# Patient Record
Sex: FEMALE | Race: WHITE | NOT HISPANIC OR LATINO | URBAN - METROPOLITAN AREA
[De-identification: names, ages, dates, MRNs, and addresses within clinical notes are randomized per-mention and may not be internally consistent; named-entity substitution may affect disease eponyms.]

---

## 2018-04-10 ENCOUNTER — TELEPHONE (OUTPATIENT)
Dept: ORTHOPEDICS | Facility: CLINIC | Age: 73
End: 2018-04-10

## 2018-04-10 NOTE — TELEPHONE ENCOUNTER
[facility] Call Center    Phone Message    May a detailed message be left on voicemail: yes    Reason for Call: Other: pt is wanting to know if you received her medical records? She wants to know when she will be able to come to see Dr Watkins . The medical records should have been come from [facility]. Pt saw Dr Valerio Alfredo.    Action Taken: Message routed to:  [facility] ([facility]): Orthopedics     4/13/18  Left message for patient to let her know we have not received her records.  Left fax #  [phone].  Asked her to call back if she has additional questions.

## 2018-04-13 ENCOUNTER — TELEPHONE (OUTPATIENT)
Dept: ORTHOPEDICS | Facility: CLINIC | Age: 73
End: 2018-04-13

## 2018-04-13 NOTE — TELEPHONE ENCOUNTER
I left a VM with Chary to inform her that unfortunately we have not received any records from [facility] regarding a possible referral to see Dr. Roque Watkins. I asked that she contact Dr. Alfredo's Office to obtain her Records and provided the Clinic Fax # ([phone]) so that we can evaluate why she is being seen and where to properly schedule her.

## 2018-05-04 ENCOUNTER — MEDICAL CORRESPONDENCE (OUTPATIENT)
Dept: HEALTH INFORMATION MANAGEMENT | Facility: CLINIC | Age: 73
End: 2018-05-04

## 2018-05-17 ENCOUNTER — TELEPHONE (OUTPATIENT)
Dept: ORTHOPEDICS | Facility: CLINIC | Age: 73
End: 2018-05-17

## 2018-05-17 NOTE — TELEPHONE ENCOUNTER
RN called and spoke with Chary.  We have received records from her([facility]) and a disc from [facility], right knee done on 09-07-17.  She was informed that she needs to have her Orthopedic Surgeon send a referral to Dr. Watkins.  She will do this. We also note that the manipulation that she had done was not included in the paperwork.  She has no other questions or concerns.

## 2018-06-01 NOTE — TELEPHONE ENCOUNTER
FUTURE VISIT INFORMATION      FUTURE VISIT INFORMATION:    Date: 6/14/18    Time: 1315    Location: ORTHO  REFERRAL INFORMATION:    Referring provider:  N/A    Referring providers clinic:  N/A    Reason for visit/diagnosis  R KNEE      RECORDS STATUS      RECORDS RECEIVED FROM:    DATE RECEIVED: 6/1/18   NOTES STATUS DETAILS   OFFICE NOTE from referring provider N/A    OFFICE NOTE from other specialist N/A    DISCHARGE SUMMARY from hospital Received 2/16/15*   DISCHARGE REPORT from the ER N/A    OPERATIVE REPORT Received 4/3/15*2/17/15*   MEDICATION LIST N/A    IMPLANT RECORD/STICKER N/A    LABS     CBC/DIFF N/A    CULTURES N/A    INJECTIONS DONE IN RADIOLOGY N/A    MRI N/A    CT SCAN N/A    XRAYS (IMAGES & REPORTS) Received 2/16/15*9/7/17   TUMOR     PATHOLOGY  Slides & report N/A

## 2018-06-14 ENCOUNTER — PRE VISIT (OUTPATIENT)
Dept: ORTHOPEDICS | Facility: CLINIC | Age: 73
End: 2018-06-14

## 2018-06-14 DIAGNOSIS — Z96.651 S/P TOTAL KNEE REPLACEMENT, RIGHT: Primary | ICD-10-CM

## 2018-07-02 ENCOUNTER — OFFICE VISIT (OUTPATIENT)
Dept: ORTHOPEDICS | Facility: CLINIC | Age: 73
End: 2018-07-02

## 2018-07-02 ENCOUNTER — RADIANT APPOINTMENT (OUTPATIENT)
Dept: GENERAL RADIOLOGY | Facility: CLINIC | Age: 73
End: 2018-07-02
Attending: ORTHOPAEDIC SURGERY

## 2018-07-02 VITALS — HEIGHT: 62 IN | BODY MASS INDEX: 31.43 KG/M2 | WEIGHT: 170.8 LBS

## 2018-07-02 DIAGNOSIS — T84.84XA PAINFUL TOTAL KNEE REPLACEMENT (H): ICD-10-CM

## 2018-07-02 DIAGNOSIS — Z96.659 PAINFUL TOTAL KNEE REPLACEMENT (H): ICD-10-CM

## 2018-07-02 DIAGNOSIS — Z96.659 PAIN DUE TO TOTAL KNEE REPLACEMENT, INITIAL ENCOUNTER (H): Primary | ICD-10-CM

## 2018-07-02 DIAGNOSIS — Z96.651 S/P TOTAL KNEE REPLACEMENT, RIGHT: ICD-10-CM

## 2018-07-02 DIAGNOSIS — T84.84XA PAIN DUE TO TOTAL KNEE REPLACEMENT, INITIAL ENCOUNTER (H): Primary | ICD-10-CM

## 2018-07-02 PROBLEM — M85.80 OSTEOPENIA: Status: ACTIVE | Noted: 2017-10-05

## 2018-07-02 PROBLEM — Z71.89 DIABETES EDUCATION, ENCOUNTER FOR: Status: ACTIVE | Noted: 2017-04-04

## 2018-07-02 RX ORDER — PEN NEEDLE, DIABETIC 31 GX5/16"
NEEDLE, DISPOSABLE MISCELLANEOUS
COMMUNITY
Start: 2017-11-14

## 2018-07-02 RX ORDER — MINOCYCLINE HYDROCHLORIDE 100 MG/1
CAPSULE ORAL
COMMUNITY
Start: 2018-02-08

## 2018-07-02 RX ORDER — GLUCOSAMINE HCL/CHONDROITIN SU 500-400 MG
CAPSULE ORAL
COMMUNITY
Start: 2017-11-14

## 2018-07-02 RX ORDER — ATORVASTATIN CALCIUM 20 MG/1
20 TABLET, FILM COATED ORAL
COMMUNITY
Start: 2017-07-13 | End: 2018-07-13

## 2018-07-02 ASSESSMENT — ENCOUNTER SYMPTOMS
COUGH DISTURBING SLEEP: 0
ABDOMINAL PAIN: 0
DIARRHEA: 1
RECTAL PAIN: 0
CONSTIPATION: 0
BACK PAIN: 0
LEG PAIN: 1
VOMITING: 0
ALTERED TEMPERATURE REGULATION: 0
NECK PAIN: 0
MEMORY LOSS: 0
NAUSEA: 0
ORTHOPNEA: 0
POSTURAL DYSPNEA: 0
INCREASED ENERGY: 0
COUGH: 0
WEIGHT GAIN: 0
LOSS OF CONSCIOUSNESS: 0
SLEEP DISTURBANCES DUE TO BREATHING: 0
ARTHRALGIAS: 1
WEAKNESS: 0
FATIGUE: 0
STIFFNESS: 1
CHILLS: 0
PANIC: 0
SPEECH CHANGE: 0
HEARTBURN: 1
TINGLING: 0
MYALGIAS: 0
DIZZINESS: 1
INSOMNIA: 1
MUSCLE CRAMPS: 0
LIGHT-HEADEDNESS: 0
SHORTNESS OF BREATH: 0
TREMORS: 0
WEIGHT LOSS: 0
HEADACHES: 0
JOINT SWELLING: 1
POLYDIPSIA: 0
DISTURBANCES IN COORDINATION: 0
DECREASED APPETITE: 0
POLYPHAGIA: 0
JAUNDICE: 0
SEIZURES: 0
EXERCISE INTOLERANCE: 0
BLOATING: 0
NUMBNESS: 1
HYPOTENSION: 0
DEPRESSION: 1
SNORES LOUDLY: 0
SPUTUM PRODUCTION: 0
BLOOD IN STOOL: 0
HEMOPTYSIS: 0
WHEEZING: 0
HYPERTENSION: 0
NIGHT SWEATS: 0
PALPITATIONS: 0
PARALYSIS: 0
BOWEL INCONTINENCE: 0
SYNCOPE: 0
HALLUCINATIONS: 0
FEVER: 0
NERVOUS/ANXIOUS: 0
MUSCLE WEAKNESS: 0
DYSPNEA ON EXERTION: 0

## 2018-07-02 ASSESSMENT — KOOS JR
HOW SEVERE IS YOUR KNEE STIFFNESS AFTER FIRST WAKING IN MORNING: SEVERE
HOW SEVERE IS YOUR KNEE STIFFNESS AFTER FIRST WAKING IN MORNING: 1

## 2018-07-02 ASSESSMENT — ACTIVITIES OF DAILY LIVING (ADL): FUNCTION,_DAILY_LIVING_SCORE: 48.52

## 2018-07-02 NOTE — LETTER
"7/2/2018       RE: Chary Bautista  [address]  [place] [address]     Dear Colleague,    Thank you for referring your patient, Chary Bautista, to the [facility]. Please see a copy of my visit note below.        [facility]  Orthopaedic Surgery, Joint Replacement Consultation  by Roque Watkins M.D.    Chray Bautista MRN# [id number]   Age: 73 year old YOB: 1945     Requesting physician: Referred [facility]  Clinic, [facility]            Assessment and Plan:   Assessment:  1. 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 XR right femur  2. CT right knee to evaluate for rotational malalignment  3. Patient was offered knee aspiration but declined unless it could be done under anesthesia  4. Refer to Dr. Payne for consideration of arthroscopic lysis of adhesions  5. Counseled patient against taking NSAIDs beyond recommendations on bottle           History of Present Illness:   73 year old female  chief complaint: right knee pain     74 yo female with history of right TKA 2/16/15 by Dr. Rachel at [facility] and manipulation under anesthesia 4/3/15 presents for evaluation of on going right knee pain. \"I just want an MRI to know what is going on in there.\" She describes ongoing knee pain since the time of surgery but progressively worsening the last few months without trauma. Denies any history of concerns for knee infection. She has worked with PT but feels this just worsens the pain. She has found a brace to be helpful. Her pain is managed through the [facility] and she takes oxycodone TID and 6-10 tablets of Naproxen. She is pursuing medical marijuana. She had has nerve ablation therapy around bilateral knees since her surgery.  Pain disrupts her sleep and limits her weight bearing/walking    Of note, patient reports that she has not seen Dr. Rachel since 2 months after surgery because he was " insistent that she did not need further narcotic pain medication at that time. She as seen one other orthopaedist for evaluation but also did not like that encounter    Current symptoms:  Problem: painful right knee arthroplasty  Onset and duration: since time of surgery, 2015  Awakens from sleep due to sx's:  Yes  Precipitating Injury:  No    Other joints or sites painful:  Left knee      Background history:  DX:  1. Right knee end stage osteoarthritis    TREATMENTS:  1. 2/16/15 right knee arthroplasty by ARIANE Fritz  2. 4/3/15 manipulation under anesthesia by ARIANE Marrero               Physical Exam:     EXAMINATION pertinent findings:   VITAL SIGNS: There were no vitals taken for this visit.  There is no height or weight on file to calculate BMI.  RESP: non labored breathing   ABD: benign   SKIN: grossly normal   LYMPHATIC: grossly normal   NEURO: grossly normal   VASCULAR: satisfactory perfusion of all extremities   MUSCULOSKELETAL: right knee with well healed incision. No erythema. No effusion. NO increased warmth. Range of motion 5-15 degrees flexion. Stable to varus and valgus stress. CMS intact distally             Data:   All laboratory data reviewed  All imaging studies reviewed by me    DATA for DOCUMENTATION:         Past Medical History:     Patient Active Problem List   Diagnosis     Degenerative arthritis of knee     Past Medical History:   Diagnosis Date     Arthritis      Gastro-oesophageal reflux disease     no current symptoms.      Rosacea        Also see scanned health assessment forms.       Past Surgical History:     Past Surgical History:   Procedure Laterality Date     ARTHROPLASTY KNEE Right 2/16/2015    Procedure: ARTHROPLASTY KNEE;  Surgeon: Naresh Rachel MD;  Location: [facility] OR     ENT SURGERY      tonsils      GYN SURGERY      regina nico oophrectomy  c/sx2     ORTHOPEDIC SURGERY      carpal tunnel      wisdom teeth              Social History:     Social History     Social  History     Marital status:      Spouse name: N/A     Number of children: N/A     Years of education: N/A     Occupational History     Not on file.     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         Medications:     Current Outpatient Prescriptions   Medication Sig     aspirin  MG tablet Take 1 tablet (325 mg) by mouth daily     MetroNIDAZOLE (METROGEL EX)      Naproxen (NAPROSYN PO) Take 440 mg by mouth 2 times daily (with meals)     ORDER FOR DME Equipment being ordered: Walker Wheels () and Walker ()  Treatment Diagnosis: Impaired gait     oxyCODONE (ROXICODONE) 5 MG immediate release tablet Take 1-2 tablets (5-10 mg) by mouth every 3 hours as needed for moderate to severe pain     senna-docusate (SENOKOT-S;PERICOLACE) 8.6-50 MG per tablet Take 1-2 tablets by mouth 2 times daily     No current facility-administered medications for this visit.               Review of Systems:   A comprehensive 10 point review of systems (constitutional, ENT, cardiac, peripheral vascular, lymphatic, respiratory, GI, , Musculoskeletal, skin, Neurological) was performed and found to be negative except as described in this note.     See intake form completed by patient       Nicci Dobson, PGY4  Orthopaedic Surgery    Attending MD (Dr. Roque Watkins) Attestation :  This patient was seen and evaluated by me including a history, exam, and interpretation of all imaging and/or lab data.  Either a training physician (resident/fellow), who also saw the patient, or scribe has documented the clinic visit in the attached note.    Roque Watkins MD  [employer] Family Professor  Oncology and Adult Reconstructive  Surgery  Dept Orthopaedic Surgery, [facility]  [phone] office, [phone] pager  [email or web address]

## 2018-07-02 NOTE — NURSING NOTE
"Chief Complaint   Patient presents with     Consult     Pt. states that she is here for Painful Right TKA done by Dr. Rachel, DOS: 2/6/2015         73 year old  1945    Ht 1.575 m (5' 2\")  Wt 77.5 kg (170 lb 12.8 oz)  BMI 31.24 kg/m2        Date/Surgery/Surgeon/Hospital:  1. 02/06/2015, Right Total Knee Replacement, Dr. Rachel             Pain Assessment  Patient Currently in Pain: Yes  Primary Pain Location: Knee  Pain Orientation: Right  Pain Descriptors: Stabbing, Shooting, Sharp, Tightness, Pressure, Pounding, Squeezing, Crushing, Numbness on the Outer Lat. Side of her Right Knee.   Alleviating Factors: Pain medication, Rest, Ice, NSAIDS (Uncurs, Pt. states that it's a Huey that she uses.)               [facility] [place], [place] - [address]    Allergies   Allergen Reactions     Premarin [Conjugated Estrogens] Other (See Comments)     Not sure       Current Outpatient Prescriptions   Medication     aspirin  MG tablet     atorvastatin (LIPITOR) 20 MG tablet     Glucose Blood (BLOOD GLUCOSE TEST STRIPS) STRP     Lancets (VITALET 26 GAUGE LANCET) MISC     metFORMIN (GLUCOPHAGE) 500 MG tablet     MetroNIDAZOLE (METROGEL EX)     minocycline (MINOCIN/DYNACIN) 100 MG capsule     Naproxen (NAPROSYN PO)     ORDER FOR DME     oxyCODONE (ROXICODONE) 5 MG immediate release tablet     ranitidine (ZANTAC) 150 MG tablet     senna-docusate (SENOKOT-S;PERICOLACE) 8.6-50 MG per tablet     No current facility-administered medications for this visit.          Questionnaires:      KOOS Knee Survey Assessment    Knee Outcome Survey ADL Scale (Jaylen, EMIR; Amina, L; Bruce, RS; Dada, FH; Betty, ROSA; 1998) 7/2/2018   Do you have swelling in your knee? Always   Do you feel grinding, hear clicking or any other type of noise when your knee moves? Never   Does your knee catch or hang up when moving? Never   Can you straighten your knee fully? Never   Can you bend your knee fully? Never   How severe " is your knee joint stiffness after first wakening in the morning? Severe   How severe is your knee stiffness after sitting, lying or resting LATER IN THE DAY? Severe   How often do you experience knee pain? Always   Twisting/pivoting on your knee Moderate   Straightening knee fully Severe   Bending knee fully Severe   Walking on flat surface Moderate   Going up or down stairs Moderate   At night while in bed Moderate   Sitting or lying Moderate   Standing upright Moderate   Descending stairs Moderate   Ascending stairs Moderate   Rising from sitting Moderate   Standing Moderate   Bending to floor/ an object Moderate   Walking on flat surface Moderate   Getting in/out of car Moderate   Going shopping Moderate   Putting on socks/stockings Moderate   Rising from bed Moderate   Taking off socks/stockings Moderate   Lying in bed (turning over, maintaining knee position) Moderate   Getting in/out of bath Moderate   Sitting Moderate   Getting on/off toilet Moderate   Heavy domestic duties (moving heavy boxes, scrubbing floors, etc) Severe   Light domestic duties (cooking, dusting, etc) Moderate   Squatting Extreme   Running Extreme   Jumping Extreme   Twisting/pivoting on your injured knee Extreme   Kneeling Extreme   How often are you aware of your knee problem? Constantly   Have you modified your life style to avoid potentially damaging activities to your knee? Totally   How much are you troubled with lack of confidence in your knee? Totally   In general, how much difficulty do you have with your knee? Extreme   Pain Score 38.88   Symptoms Score 64.28   Function, Daily Living Score 48.52   Sports and Rec Score 0   Quality of Life Score 0            Promis 10 Assessment    PROMIS 10 7/2/2018   In general, would you say your health is: Fair   In general, would you say your quality of life is: Fair   In general, how would you rate your physical health? Fair   In general, how would you rate your mental health,  including your mood and your ability to think? Good   In general, how would you rate your satisfaction with your social activities and relationships? Fair   In general, please rate how well you carry out your usual social activities and roles Fair   To what extent are you able to carry out your everyday physical activities such as walking, climbing stairs, carrying groceries, or moving a chair? A little   How often have you been bothered by emotional problems such as feeling anxious, depressed or irritable? Often   How would you rate your fatigue on average? Moderate   How would you rate your pain on average?   0 = No Pain  to  10 = Worst Imaginable Pain 9   In general, would you say your health is: 2   In general, would you say your quality of life is: 2   In general, how would you rate your physical health? 2   In general, how would you rate your mental health, including your mood and your ability to think? 3   In general, how would you rate your satisfaction with your social activities and relationships? 2   In general, please rate how well you carry out your usual social activities and roles. (This includes activities at home, at work and in your community, and responsibilities as a parent, child, spouse, employee, friend, etc.) 2   To what extent are you able to carry out your everyday physical activities such as walking, climbing stairs, carrying groceries, or moving a chair? 2   In the past 7 days, how often have you been bothered by emotional problems such as feeling anxious, depressed, or irritable? 4   In the past 7 days, how would you rate your fatigue on average? 3   In the past 7 days, how would you rate your pain on average, where 0 means no pain, and 10 means worst imaginable pain? 9   Global Mental Health Score 9   Global Physical Health Score 9   PROMIS TOTAL - SUBSCORES 18   Some recent data might be hidden

## 2018-07-02 NOTE — MR AVS SNAPSHOT
After Visit Summary   7/2/2018    Chary Bautista    MRN: [id number]           Patient Information     Date Of Birth          1945        Visit Information        Provider Department      7/2/2018 1:30 PM Roque Watkins MD [facility]        Today's Diagnoses     Painful total knee replacement (H)    -  1       Follow-ups after your visit        Your next 10 appointments already scheduled     Jul 11, 2018  9:00 AM CDT   NM INJECT 3PH BONE SCAN with [address], [place], Nuclear Medicine ([facility])    [address] [place] [id number] [phone]            Jul 11, 2018  9:20 AM CDT   CT LOWER EXTREMITY RIGHT W/O CONTRAST with UUCT1   [facility], [place], [place] ([facility])    [address] [place] [id number] [phone]           Please bring any scans or X-rays taken at other hospitals, if similar tests were done. Also bring a list of your medicines, including vitamins, minerals and over-the-counter drugs. It is safest to leave personal items at home.  Be sure to tell your doctor:   If you have any allergies.   If there s any chance you are pregnant.   If you are breastfeeding.  You do not need to do anything special to prepare for this exam.  Please wear loose clothing, such as a sweat suit or jogging clothes. Avoid snaps, zippers and other metal. We may ask you to undress and put on a hospital gown.            Jul 11, 2018 12:00 PM CDT   NM BONE SCAN WHOLE BODY 3 PHASE with [address], [place], Nuclear Medicine ([facility])    [address] [place] [id number] [phone]           Please bring a list of your medicines to the exam. (Include vitamins, minerals and over-the-counter drugs.) You should wear comfortable clothes. Leave your valuables at home. Please bring related prior results and films.   "Tell your doctor:   If you are breastfeeding or may be pregnant.   If you have had a barium test within the past 48 hours. Barium may change the results of certain exams.   If you think you may need sedation (medicine to help you relax).  You may eat and drink as normal.  Please call your Imaging Department at your exam site with any questions.              Future tests that were ordered for you today     Open Future Orders        Priority Expected Expires Ordered    CT Lower extremity RT w/o contrast Routine  2019    NM Bone 3 Phase With Whole Body Routine  2019            Who to contact     Please call your clinic at [phone] to:    Ask questions about your health    Make or cancel appointments    Discuss your medicines    Learn about your test results    Speak to your doctor            Additional Information About Your Visit        [employer] Information     Wormhole is an electronic gateway that provides easy, online access to your medical records. With Wormhole, you can request a clinic appointment, read your test results, renew a prescription or communicate with your care team.     To sign up for Wormhole visit the website at [email or web address]   You will be asked to enter the access code listed below, as well as some personal information. Please follow the directions to create your username and password.     Your access code is: [email or web address]  Expires: 2018  6:30 AM     Your access code will  in 90 days. If you need help or a new code, please contact your [facility] or call [phone] for assistance.        Care EveryWhere ID     This is your Care EveryWhere ID. This could be used by other organizations to access your [facility] medical records  [id number]        Your Vitals Were     Height BMI (Body Mass Index)                1.575 m (5' 2\") 31.24 kg/m2           Blood Pressure from Last 3 Encounters:   02/19/15 116/57    Weight from " Last 3 Encounters:   07/02/18 77.5 kg (170 lb 12.8 oz)   02/16/15 75.3 kg (166 lb 0.1 oz)               Primary Care Provider Office Phone # Fax #    [facility] [phone]       Mala Mercero Edna  [facility] [address]        Equal Access to Services     ANNETTE BURGER : Hadii aad ku hadasho Soomaali, waaxda luqadaha, qaybta kaalmada adeegyada, waxay idiin hayaan adeeg khantoniosh laLelandzoyan . So [facility] [phone].    ATENCIÓN: Si habla español, tiene a kenny disposición servicios gratuitos de asistencia lingüística. Ashwin al [phone].    We comply with applicable federal civil rights laws and [place] laws. We do not discriminate on the basis of race, color, national origin, age, disability, sex, sexual orientation, or gender identity.            Thank you!     Thank you for choosing [facility]  for your care. Our goal is always to provide you with excellent care. Hearing back from our patients is one way we can continue to improve our services. Please take a few minutes to complete the written survey that you may receive in the mail after your visit with us. Thank you!             Your Updated Medication List - Protect others around you: Learn how to safely use, store and throw away your medicines at www.disposemymeds.org.          This list is accurate as of 7/2/18  4:02 PM.  Always use your most recent med list.                   Brand Name Dispense Instructions for use Diagnosis    aspirin 325 MG EC tablet     35 tablet    Take 1 tablet (325 mg) by mouth daily    Status post total knee replacement, right       atorvastatin 20 MG tablet    LIPITOR     Take 20 mg by mouth    Painful total knee replacement (H)       BLOOD GLUCOSE TEST STRIPS Strp      Use  to test daily. Use as directed. Pharmacy dispense brand based on insurance.    Painful total knee replacement (H)       metFORMIN 500 MG tablet    GLUCOPHAGE     TAKE 2 TABLETS BY MOUTH TWICE DAILY WITH MEALS    Painful total  knee replacement (H)       METROGEL EX           minocycline 100 MG capsule    MINOCIN/DYNACIN     TAKE 1 CAP BY MOUTH TWO TIMES A DAY.    Painful total knee replacement (H)       NAPROSYN PO      Take 440 mg by mouth 2 times daily (with meals)        order for DME     1 each    Equipment being ordered: Walker Wheels () and Walker () Treatment Diagnosis: Impaired gait    Status post total knee replacement, right       oxyCODONE IR 5 MG tablet    ROXICODONE    100 tablet    Take 1-2 tablets (5-10 mg) by mouth every 3 hours as needed for moderate to severe pain    Status post total knee replacement, right       ranitidine 150 MG tablet    ZANTAC      Painful total knee replacement (H)       senna-docusate 8.6-50 MG per tablet    SENOKOT-S;PERICOLACE    30 tablet    Take 1-2 tablets by mouth 2 times daily    Status post total knee replacement, right       VITALET 26 GAUGE LANCET Misc      Use  to test daily.    Painful total knee replacement (H)

## 2018-07-02 NOTE — PROGRESS NOTES
"    [facility]  Orthopaedic Surgery, Joint Replacement Consultation  by Roque Watkins M.D.    Chary Bautista MRN# [id number]   Age: 73 year old YOB: 1945     Requesting physician: [facility], [facility]            Assessment and Plan:   Assessment:  1. 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 XR right femur  2. CT right knee to evaluate for rotational malalignment  3. Patient was offered knee aspiration but declined unless it could be done under anesthesia  4. Refer to Dr. Payne for consideration of arthroscopic lysis of adhesions  5. Counseled patient against taking NSAIDs beyond recommendations on bottle           History of Present Illness:   73 year old female  chief complaint: right knee pain     72 yo female with history of right TKA 2/16/15 by Dr. Rachel at [facility] and manipulation under anesthesia 4/3/15 presents for evaluation of on going right knee pain. \"I just want an MRI to know what is going on in there.\" She describes ongoing knee pain since the time of surgery but progressively worsening the last few months without trauma. Denies any history of concerns for knee infection. She has worked with PT but feels this just worsens the pain. She has found a brace to be helpful. Her pain is managed through the [facility] and she takes oxycodone TID and 6-10 tablets of Naproxen. She is pursuing medical marijuana. She had has nerve ablation therapy around bilateral knees since her surgery.  Pain disrupts her sleep and limits her weight bearing/walking    Of note, patient reports that she has not seen Dr. Rachel since 2 months after surgery because he was insistent that she did not need further narcotic pain medication at that time. She as seen one other orthopaedist for evaluation but also did not like that encounter    Current symptoms:  Problem: painful right knee arthroplasty  Onset and duration: since time of surgery, 2015  Awakens from " sleep due to sx's:  Yes  Precipitating Injury:  No    Other joints or sites painful:  Left knee      Background history:  DX:  1. Right knee end stage osteoarthritis    TREATMENTS:  1. 2/16/15 right knee arthroplasty by ARIANE Fritz  2. 4/3/15 manipulation under anesthesia by ARIANE Marrero               Physical Exam:     EXAMINATION pertinent findings:   VITAL SIGNS: There were no vitals taken for this visit.  There is no height or weight on file to calculate BMI.  RESP: non labored breathing   ABD: benign   SKIN: grossly normal   LYMPHATIC: grossly normal   NEURO: grossly normal   VASCULAR: satisfactory perfusion of all extremities   MUSCULOSKELETAL: right knee with well healed incision. No erythema. No effusion. NO increased warmth. Range of motion 5-15 degrees flexion. Stable to varus and valgus stress. CMS intact distally             Data:   All laboratory data reviewed  All imaging studies reviewed by me    DATA for DOCUMENTATION:         Past Medical History:     Patient Active Problem List   Diagnosis     Degenerative arthritis of knee     Past Medical History:   Diagnosis Date     Arthritis      Gastro-oesophageal reflux disease     no current symptoms.      Rosacea        Also see scanned health assessment forms.       Past Surgical History:     Past Surgical History:   Procedure Laterality Date     ARTHROPLASTY KNEE Right 2/16/2015    Procedure: ARTHROPLASTY KNEE;  Surgeon: Naresh Rachel MD;  Location: [facility] OR     ENT SURGERY      tonsils      GYN SURGERY      regina nico oophrectomy  c/sx2     ORTHOPEDIC SURGERY      carpal tunnel      wisdom teeth              Social History:     Social History     Social History     Marital status:      Spouse name: N/A     Number of children: N/A     Years of education: N/A     Occupational History     Not on file.     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         Medications:     Current Outpatient Prescriptions   Medication Sig     aspirin  MG tablet Take 1 tablet (325 mg) by mouth daily     MetroNIDAZOLE (METROGEL EX)      Naproxen (NAPROSYN PO) Take 440 mg by mouth 2 times daily (with meals)     ORDER FOR DME Equipment being ordered: Walker Wheels () and Walker ()  Treatment Diagnosis: Impaired gait     oxyCODONE (ROXICODONE) 5 MG immediate release tablet Take 1-2 tablets (5-10 mg) by mouth every 3 hours as needed for moderate to severe pain     senna-docusate (SENOKOT-S;PERICOLACE) 8.6-50 MG per tablet Take 1-2 tablets by mouth 2 times daily     No current facility-administered medications for this visit.               Review of Systems:   A comprehensive 10 point review of systems (constitutional, ENT, cardiac, peripheral vascular, lymphatic, respiratory, GI, , Musculoskeletal, skin, Neurological) was performed and found to be negative except as described in this note.     See intake form completed by patient    Answers for HPI/ROS submitted by the patient on 7/2/2018   General Symptoms: Yes  Skin Symptoms: No  HENT Symptoms: No  EYE SYMPTOMS: No  HEART SYMPTOMS: Yes  LUNG SYMPTOMS: Yes  INTESTINAL SYMPTOMS: Yes  URINARY SYMPTOMS: No  GYNECOLOGIC SYMPTOMS: No  BREAST SYMPTOMS: No  SKELETAL SYMPTOMS: Yes  BLOOD SYMPTOMS: No  NERVOUS SYSTEM SYMPTOMS: Yes  MENTAL HEALTH SYMPTOMS: Yes  Fever: No  Loss of appetite: No  Weight loss: No  Weight gain: No  Fatigue: No  Night sweats: No  Chills: No  Increased stress: No  Excessive hunger: No  Excessive thirst: No  Feeling hot or cold when others believe the temperature is normal: No  Loss of height: No  Post-operative complications: Yes  Surgical site pain: Yes  Hallucinations: No  Change in or  Loss of Energy: No  Hyperactivity: No  Confusion: No  Cough: No  Sputum or phlegm: No  Coughing up blood: No  Difficulty breating or shortness of breath: No  Snoring: No  Wheezing: No  Difficulty breathing on exertion: No  Nighttime Cough: No  Difficulty breathing when lying flat: No  Chest pain or pressure: No  Fast or irregular heartbeat: No  Pain in legs with walking: Yes  Trouble breathing while lying down: No  Fingers or toes appear blue: No  High blood pressure: No  Low blood pressure: No  Fainting: No  Murmurs: No  Pacemaker: No  Varicose veins: No  Edema or swelling: Yes  Wake up at night with shortness of breath: No  Light-headedness: No  Exercise intolerance: No  Heart burn or indigestion: Yes  Nausea: No  Vomiting: No  Abdominal pain: No  Bloating: No  Constipation: No  Diarrhea: Yes  Blood in stool: No  Black stools: No  Rectal or Anal pain: No  Fecal incontinence: No  Yellowing of skin or eyes: No  Vomit with blood: No  Change in stools: No  Back pain: No  Muscle aches: No  Neck pain: No  Swollen joints: Yes  Joint pain: Yes  Bone pain: No  Muscle cramps: No  Muscle weakness: No  Joint stiffness: Yes  Bone fracture: No  Trouble with coordination: No  Dizziness or trouble with balance: Yes  Fainting or black-out spells: No  Memory loss: No  Headache: No  Seizures: No  Speech problems: No  Tingling: No  Tremor: No  Weakness: No  Difficulty walking: Yes  Paralysis: No  Numbness: Yes  Nervous or Anxious: No  Depression: Yes  Trouble sleeping: Yes  Mood changes: No  Panic attacks: No  PHQ-2 Score: 2      Nicci Dobson, PGY4  Orthopaedic Surgery    Attending MD (Dr. Roque Watkins) Attestation :  This patient was seen and evaluated by me including a history, exam, and interpretation of all imaging and/or lab data.  Either a training physician (resident/fellow), who also saw the patient, or scribe has documented the clinic visit in the attached note.    Roque Watkins MD  [facility] Professor  Oncology  and Adult Reconstructive Surgery  Dept Orthopaedic Surgery, [facility]  [phone] office, [phone] pager  [email or web address]

## 2018-07-03 ENCOUNTER — TRANSFERRED RECORDS (OUTPATIENT)
Dept: HEALTH INFORMATION MANAGEMENT | Facility: CLINIC | Age: 73
End: 2018-07-03

## 2018-07-11 ENCOUNTER — HOSPITAL ENCOUNTER (OUTPATIENT)
Dept: CT IMAGING | Facility: CLINIC | Age: 73
Discharge: HOME | End: 2018-07-11
Attending: ORTHOPAEDIC SURGERY | Admitting: ORTHOPAEDIC SURGERY
Payer: MEDICARE

## 2018-07-11 ENCOUNTER — HOSPITAL ENCOUNTER (OUTPATIENT)
Dept: NUCLEAR MEDICINE | Facility: CLINIC | Age: 73
Setting detail: NUCLEAR MEDICINE
Discharge: HOME | End: 2018-07-11
Attending: ORTHOPAEDIC SURGERY | Admitting: ORTHOPAEDIC SURGERY
Payer: MEDICARE

## 2018-07-11 ENCOUNTER — HOSPITAL ENCOUNTER (OUTPATIENT)
Dept: NUCLEAR MEDICINE | Facility: CLINIC | Age: 73
Setting detail: NUCLEAR MEDICINE
End: 2018-07-11
Attending: ORTHOPAEDIC SURGERY
Payer: MEDICARE

## 2018-07-11 DIAGNOSIS — Z96.659 PAIN DUE TO TOTAL KNEE REPLACEMENT, INITIAL ENCOUNTER (H): ICD-10-CM

## 2018-07-11 DIAGNOSIS — T84.84XA PAIN DUE TO TOTAL KNEE REPLACEMENT, INITIAL ENCOUNTER (H): ICD-10-CM

## 2018-07-11 PROCEDURE — A9503 TC99M MEDRONATE: HCPCS | Performed by: ORTHOPAEDIC SURGERY

## 2018-07-11 PROCEDURE — 78315 BONE IMAGING 3 PHASE: CPT

## 2018-07-11 PROCEDURE — 73700 CT LOWER EXTREMITY W/O DYE: CPT | Mod: RT

## 2018-07-11 PROCEDURE — 34300033 ZZH RX 343: Performed by: ORTHOPAEDIC SURGERY

## 2018-07-11 RX ORDER — TC 99M MEDRONATE 20 MG/10ML
20-30 INJECTION, POWDER, LYOPHILIZED, FOR SOLUTION INTRAVENOUS ONCE
Status: COMPLETED | OUTPATIENT
Start: 2018-07-11 | End: 2018-07-11

## 2018-07-11 RX ADMIN — TC 99M MEDRONATE 23.78 MCI.: 20 INJECTION, POWDER, LYOPHILIZED, FOR SOLUTION INTRAVENOUS at 09:26

## 2018-08-31 ENCOUNTER — TELEPHONE (OUTPATIENT)
Dept: ORTHOPEDICS | Facility: CLINIC | Age: 73
End: 2018-08-31

## 2018-08-31 NOTE — TELEPHONE ENCOUNTER
"I called pt and left voice message at home.  I will have the following letter sent to her.    _______________________  Dear Ms. Bautista,    I reviewed the bone scan and CT scan performed in July.  There is no evidence of the knee implant being either loose or broken or malpositioned.  Regarding a possible infection, I think it's unlikely, however, to evaluate for this with further testing, we would need to use a needle to remove fluid from the knee and you declined to have this done in the clinic.      I think your pain most likely is related to the stiffness of your knee which is termed \"arthrofibrosis\".  As we discussed during your clinic visit, a consultation with Dr. Sarina Payne regarding possible removal of the scarred tissue using an arthroscope is reasonable.  If you wish to pursue this, please contact Olga Gallegos [phone], to setup an appointment with Dr. Payne.    Sincerely yours,    MD Maggie Estrada Family Professor  Oncology and Adult Reconstructive Surgery  Dept Orthopaedic Surgery, [facility]  [phone] office, [phone] pager  [email or web address]    "

## 2021-04-06 NOTE — TELEPHONE ENCOUNTER
DIAGNOSIS: Right knee pain/ 02/15/15 HX replacement   APPOINTMENT DATE: 4.10.21   NOTES STATUS DETAILS   OFFICE NOTE from referring provider N/A    OFFICE NOTE from other specialist Internal 9.11.18 Dr Nik Willson,   9.18.17  7.3.18 [facility] (more scanned)  7.2.18 Dr Roque Watkins, [facility]   DISCHARGE SUMMARY from hospital Internal 2.16.15-2.19.15 [facility] [place]   DISCHARGE REPORT from the ER Care Everywhere 6.24.18  [facility]   OPERATIVE REPORT Internal 4.3.15  Dr Rachel, [facility]  2.17.15 Dr Rachel, internal   EMG report N/A    MEDICATION LIST Internal    MRI N/A    DEXA (osteoporosis/bone health) N/A    CT SCAN Internal 7.11.18 CT lower extremity   XRAYS (IMAGES & REPORTS) Internal 7.2.18 R knee  9.7.17  2.16.15 R knee port     Action 4.6.21 7:44 AM TETO   Action Taken Reqested records from [facility] pain clinic [phone] and [phone]      Action 4.9.21 9:44 AM TETO   Action Taken Gurdeep ([facility]) needs an WILD. Called [facility] and the pt has not been seen there since 2015.  Printed off WILD and brought to 4N for pt to sign at appointment.

## 2021-04-10 ENCOUNTER — DOCUMENTATION ONLY (OUTPATIENT)
Dept: CARE COORDINATION | Facility: CLINIC | Age: 76
End: 2021-04-10

## 2021-04-10 ENCOUNTER — PRE VISIT (OUTPATIENT)
Dept: ORTHOPEDICS | Facility: CLINIC | Age: 76
End: 2021-04-10

## 2022-02-17 PROBLEM — E11.65 TYPE 2 DIABETES MELLITUS WITH HYPERGLYCEMIA (H): Status: ACTIVE | Noted: 2017-04-04
